# Patient Record
Sex: MALE | Race: WHITE | NOT HISPANIC OR LATINO | Employment: OTHER | URBAN - NONMETROPOLITAN AREA
[De-identification: names, ages, dates, MRNs, and addresses within clinical notes are randomized per-mention and may not be internally consistent; named-entity substitution may affect disease eponyms.]

---

## 2018-05-11 ENCOUNTER — HOSPITAL ENCOUNTER (OUTPATIENT)
Dept: GENERAL RADIOLOGY | Facility: HOSPITAL | Age: 43
Discharge: HOME | End: 2018-05-11
Admitting: FAMILY MEDICINE

## 2018-05-11 ENCOUNTER — TRANSCRIBE ORDERS (OUTPATIENT)
Dept: ADMINISTRATIVE | Facility: HOSPITAL | Age: 43
End: 2018-05-11

## 2018-05-11 DIAGNOSIS — R05.9 COUGH: Primary | ICD-10-CM

## 2018-05-11 PROCEDURE — 71046 X-RAY EXAM CHEST 2 VIEWS: CPT

## 2018-05-14 ENCOUNTER — TRANSCRIBE ORDERS (OUTPATIENT)
Dept: ADMINISTRATIVE | Facility: HOSPITAL | Age: 43
End: 2018-05-14

## 2018-05-14 DIAGNOSIS — R06.89 HYPOVENTILATION, IDIOPATHIC: ICD-10-CM

## 2018-05-14 DIAGNOSIS — R74.8 ELEVATED LIVER ENZYMES: Primary | ICD-10-CM

## 2018-05-16 ENCOUNTER — HOSPITAL ENCOUNTER (OUTPATIENT)
Dept: ULTRASOUND IMAGING | Facility: HOSPITAL | Age: 43
Discharge: HOME | End: 2018-05-16
Admitting: FAMILY MEDICINE

## 2018-05-16 DIAGNOSIS — R74.8 ELEVATED LIVER ENZYMES: ICD-10-CM

## 2018-05-16 PROCEDURE — 76700 US EXAM ABDOM COMPLETE: CPT

## 2018-05-17 ENCOUNTER — HOSPITAL ENCOUNTER (OUTPATIENT)
Dept: CT IMAGING | Facility: HOSPITAL | Age: 43
Discharge: HOME | End: 2018-05-17
Admitting: FAMILY MEDICINE

## 2018-05-17 DIAGNOSIS — R06.89 HYPOVENTILATION, IDIOPATHIC: ICD-10-CM

## 2018-05-17 PROCEDURE — 25010000002 IOPAMIDOL 61 % SOLUTION: Performed by: FAMILY MEDICINE

## 2018-05-17 PROCEDURE — 71275 CT ANGIOGRAPHY CHEST: CPT

## 2018-05-17 RX ADMIN — IOPAMIDOL 100 ML: 612 INJECTION, SOLUTION INTRAVENOUS at 08:45

## 2018-05-18 ENCOUNTER — HOSPITAL ENCOUNTER (OUTPATIENT)
Dept: CT IMAGING | Facility: HOSPITAL | Age: 43
End: 2018-05-18

## 2018-07-02 ENCOUNTER — LAB (OUTPATIENT)
Dept: LAB | Facility: HOSPITAL | Age: 43
End: 2018-07-02

## 2018-07-02 ENCOUNTER — CONSULT (OUTPATIENT)
Dept: ONCOLOGY | Facility: CLINIC | Age: 43
End: 2018-07-02

## 2018-07-02 VITALS
TEMPERATURE: 97.6 F | OXYGEN SATURATION: 100 % | HEART RATE: 94 BPM | HEIGHT: 68 IN | SYSTOLIC BLOOD PRESSURE: 99 MMHG | WEIGHT: 156 LBS | BODY MASS INDEX: 23.64 KG/M2 | RESPIRATION RATE: 14 BRPM | DIASTOLIC BLOOD PRESSURE: 65 MMHG

## 2018-07-02 DIAGNOSIS — I27.82 OTHER CHRONIC PULMONARY EMBOLISM WITHOUT ACUTE COR PULMONALE (HCC): Primary | ICD-10-CM

## 2018-07-02 DIAGNOSIS — I27.82 OTHER CHRONIC PULMONARY EMBOLISM WITHOUT ACUTE COR PULMONALE (HCC): ICD-10-CM

## 2018-07-02 PROBLEM — I26.99 PULMONARY EMBOLISM (HCC): Status: ACTIVE | Noted: 2018-07-02

## 2018-07-02 PROCEDURE — 81240 F2 GENE: CPT

## 2018-07-02 PROCEDURE — 86146 BETA-2 GLYCOPROTEIN ANTIBODY: CPT

## 2018-07-02 PROCEDURE — 99204 OFFICE O/P NEW MOD 45 MIN: CPT | Performed by: INTERNAL MEDICINE

## 2018-07-02 PROCEDURE — 85300 ANTITHROMBIN III ACTIVITY: CPT

## 2018-07-02 PROCEDURE — 36415 COLL VENOUS BLD VENIPUNCTURE: CPT

## 2018-07-02 PROCEDURE — 86147 CARDIOLIPIN ANTIBODY EA IG: CPT

## 2018-07-02 PROCEDURE — 81241 F5 GENE: CPT

## 2018-07-02 RX ORDER — SIMVASTATIN 20 MG
20 TABLET ORAL DAILY
Refills: 0 | COMMUNITY
Start: 2018-06-18 | End: 2019-04-10

## 2018-07-02 RX ORDER — LOSARTAN POTASSIUM 25 MG/1
25 TABLET ORAL DAILY
Refills: 0 | COMMUNITY
Start: 2018-06-13 | End: 2019-04-10

## 2018-07-02 RX ORDER — DIAZEPAM 10 MG/1
10 TABLET ORAL 2 TIMES DAILY PRN
Refills: 0 | COMMUNITY
Start: 2018-06-12 | End: 2019-04-10

## 2018-07-02 RX ORDER — FOLIC ACID 1 MG/1
1 TABLET ORAL DAILY
COMMUNITY
End: 2019-04-10

## 2018-07-02 RX ORDER — CITALOPRAM 20 MG/1
TABLET ORAL
Refills: 0 | COMMUNITY
Start: 2018-06-12

## 2018-07-02 NOTE — PROGRESS NOTES
ID: 43 y.o. year old male from [place] [address]    PCP: Hailey Guerra MD    REFERRING PHYSICIAN: Hailey Guerra MD    Reason for Consultation: Hx of pulmonary embolism    Dear Dr. Guerra    I am seeing Mr. Ramires today in consultation because of his history of a pulmonary embolism.  She reports that over 5 years ago he presented with a large PE which required an extensive admission to the hospital.  He subsequently was placed on Coumadin which was hard to titrate and so was switched to a normal anticoagulant.  He'll finish that after 6 months and was discontinued on treatment.  Since then he's been doing reasonably well.  He reports no prior clot that he does have a brother who has also a pulmonary embolism.  He has an extensive alcohol abuse history and he smokes a pack and a half per day.  He reports having some chest tightness and pleuritic chest pain that's ongoing.  The chest pain appears to be musculoskeletal since he can palpate it on his chest wall.  He recently stopped drinking.      Past Medical History:   Diagnosis Date   • ETOH abuse    • Pulmonary embolism (CMS/HCC)        Past Surgical History:   Procedure Laterality Date   • SHOULDER SURGERY Right        Social History     Social History   • Marital status: Single     Social History Main Topics   • Smoking status: Current Every Day Smoker     Packs/day: 1.50     Types: Cigarettes   • Alcohol use Yes   • Drug use: No   • Sexual activity: Defer     Other Topics Concern   • Not on file       Family History   Problem Relation Age of Onset   • Hypertension Mother    • Lung cancer Father 61   • Hypertension Brother    • Breast cancer Paternal Grandmother    • Kidney cancer Paternal Grandfather    • Kidney disease Maternal Aunt        Review of Systems:    16 point review of systems was performed and reviewed and scanned into the EMR      Current Outpatient Prescriptions:   •  folic acid (FOLVITE) 1 MG tablet, Take 1 mg by mouth Daily., Disp:  , Rfl:   •  Multiple Vitamins-Minerals (CERTAVITE/ANTIOXIDANTS PO), Take  by mouth., Disp: , Rfl:   •  citalopram (CeleXA) 20 MG tablet, take 1/2 tablet by mouth daily for 7 days then 1 tablet daily, Disp: , Rfl: 0  •  diazePAM (VALIUM) 10 MG tablet, Take 10 mg by mouth 2 (Two) Times a Day As Needed., Disp: , Rfl: 0  •  losartan (COZAAR) 25 MG tablet, Take 25 mg by mouth Daily., Disp: , Rfl: 0  •  simvastatin (ZOCOR) 20 MG tablet, Take 20 mg by mouth Daily., Disp: , Rfl: 0    Allergies not on file    ECOG SCORE: 0    Objective     Vitals:    07/02/18 1334   BP: 99/65   Pulse: 94   Resp: 14   Temp: 97.6 °F (36.4 °C)   SpO2: 100%       Physical Exam    General: well appearing, in no acute distress  HEENT: sclera anicteric, oropharynx clear, neck is supple  Lymphatics: no cervical, supraclavicular, or axillary adenopathy  Cardiovascular: regular rate and rhythm, no murmurs, rubs or gallops  Lungs: clear to auscultation bilaterally  Abdomen: soft, nontender, nondistended.  No palpable organomegaly  Extremities: no lower extremity edema  Skin: no rashes, lesions, bruising, or petechiae  Msk:  Shows no weakness of the large muscle groups  Psych: Mood is stable      Lab Results   Component Value Date    GLUCOSE 83 03/20/2014    BUN 16 03/20/2014    CREATININE 0.92 03/20/2014     03/20/2014    K 3.7 03/20/2014     03/20/2014    CO2 25.0 03/20/2014    CALCIUM 8.5 03/20/2014    PROTEINTOT 6.9 03/20/2014    ALBUMIN 4.1 03/20/2014    BILITOT 0.3 03/20/2014    ALKPHOS 78 03/20/2014    AST 64 (H) 03/20/2014    ALT 52 (H) 03/20/2014       Lab Results   Component Value Date    HGB 14.2 03/20/2014    HCT 42.0 03/20/2014    MCV 98.4 (H) 03/20/2014     03/20/2014    WBC 5.8 03/20/2014    NEUTROABS 2.5 03/20/2014    LYMPHSABS 2.4 03/20/2014    MONOSABS 0.7 03/20/2014    EOSABS 0.1 03/20/2014    BASOSABS 0.1 03/20/2014       ASSESSMENT:    43-year-old gentleman with history of pulmonary embolism likely related to his  underlying work which involves  at that time.  He had just made a trip to [place] and presented with some leg pain subsequently acute chest pain which led to his diagnosis.  The extensive smoking probably also contributed to his risk.  Because his brother also has blood clots and the patient has a daughter, I would recommend hypercoagulable testing which I have ordered today.  If this is negative he does not need any further workup from my standpoint.  He however does need to have smoking cessation and continue to refrain from heavy alcohol use.  He should also.  Attention to long car rides and periods of  Inactivity.        Thank you for allowing me to participate in the care of this patient.    Yours sincerely,    Viridiana Godinez MD  [facility]  Hematology and Oncology    Return on: 08/02/18  Return in (Approximately): 1 month    Orders Placed This Encounter   Procedures   • Antithrombin III     Standing Status:   Future     Number of Occurrences:   1     Standing Expiration Date:   7/2/2019   • Factor 5 Leiden     Standing Status:   Future     Number of Occurrences:   1     Standing Expiration Date:   7/2/2019   • Prothrombin gene mutation     Standing Status:   Future     Number of Occurrences:   1     Standing Expiration Date:   7/2/2019   • Beta-2 Glycoprotein Antibodies     Standing Status:   Future     Number of Occurrences:   1     Standing Expiration Date:   7/2/2019   • Cardiolipin Antibody     Standing Status:   Future     Number of Occurrences:   1     Standing Expiration Date:   7/2/2019         Please note that portions of this note may have been completed with a voice recognition program. Efforts were made to edit the dictations, but occasionally words are mistranscribed.

## 2018-07-04 LAB
AT III PPP CHRO-ACNC: 64 % (ref 75–135)
B2 GLYCOPROT1 IGA SER-ACNC: <9 GPI IGA UNITS (ref 0–25)
B2 GLYCOPROT1 IGG SER-ACNC: <9 GPI IGG UNITS (ref 0–20)
B2 GLYCOPROT1 IGM SER-ACNC: <9 GPI IGM UNITS (ref 0–32)

## 2018-07-06 LAB
F2 GENE MUT ANL BLD/T: ABNORMAL
F5 GENE MUT ANL BLD/T: NORMAL

## 2018-07-09 LAB — REF LAB TEST METHOD: NORMAL

## 2018-08-08 ENCOUNTER — TELEPHONE (OUTPATIENT)
Dept: ONCOLOGY | Facility: CLINIC | Age: 43
End: 2018-08-08

## 2018-08-08 NOTE — TELEPHONE ENCOUNTER
----- Message from eLeanne Hemphill sent at 8/7/2018  3:39 PM EDT -----  Regarding: JOE - LAB RESULTS  Contact: [phone]  PATIENT CALLED AND WANTED LAB RESULTS.  PLEASE CALL BACK

## 2018-08-08 NOTE — TELEPHONE ENCOUNTER
Patient called and requested lab results.  Explained to patient that Dr Blevins was off on Tuesdays to be able to discuss the results of labs.  Patient asked to have Dr Blevins call him with those results to discuss that he didn't want to travel to clinic for follow up visit that is scheduled for next week reporting concern with the cost for visit when the results can be given to him over the phone.  I explained that I would discuss with Dr Blevins.    Discussed with Dr Blevins that patient requested a phone call instead of traveling and paying co pay for lab results.  Dr Blevins to look over labs and decide if a call was appropriated or patient needs to keep follow up visit.

## 2018-08-09 ENCOUNTER — TELEPHONE (OUTPATIENT)
Dept: ONCOLOGY | Facility: CLINIC | Age: 43
End: 2018-08-09

## 2018-08-09 NOTE — TELEPHONE ENCOUNTER
----- Message from Leeanne Hemphill sent at 8/7/2018  3:39 PM EDT -----  Regarding: [facility]E - LAB RESULTS  Contact: [phone]  PATIENT CALLED AND WANTED LAB RESULTS.  PLEASE CALL BACK

## 2018-08-09 NOTE — TELEPHONE ENCOUNTER
Returned call to patient after discussing with Dr Blevins.  Reported to patient that patient has Factor II heterozygate, and Dr Blevins advises that children need to be tested.  Patient verbalized understanding and ask to have follow up appointment cancelled.

## 2018-10-01 ENCOUNTER — TRANSCRIBE ORDERS (OUTPATIENT)
Dept: ADMINISTRATIVE | Facility: HOSPITAL | Age: 43
End: 2018-10-01

## 2018-10-01 DIAGNOSIS — R06.09 DYSPNEA ON EXERTION: Primary | ICD-10-CM

## 2018-11-19 ENCOUNTER — TELEPHONE (OUTPATIENT)
Dept: GENETICS | Facility: HOSPITAL | Age: 43
End: 2018-11-19

## 2018-11-19 NOTE — TELEPHONE ENCOUNTER
Called to schedule Genetic appt. Patients spouse stated they are not interested in having Genetics done at this time. Will call back if they change their minds.

## 2018-11-26 ENCOUNTER — APPOINTMENT (OUTPATIENT)
Dept: NUTRITION | Facility: HOSPITAL | Age: 43
End: 2018-11-26

## 2018-12-03 ENCOUNTER — LAB REQUISITION (OUTPATIENT)
Dept: LAB | Facility: HOSPITAL | Age: 43
End: 2018-12-03

## 2018-12-03 DIAGNOSIS — R94.5 ABNORMAL RESULTS OF LIVER FUNCTION STUDIES: ICD-10-CM

## 2018-12-03 LAB
ADV 40+41 DNA STL QL NAA+NON-PROBE: NOT DETECTED
ASTRO TYP 1-8 RNA STL QL NAA+NON-PROBE: NOT DETECTED
C CAYETANENSIS DNA STL QL NAA+NON-PROBE: NOT DETECTED
CAMPY SP DNA.DIARRHEA STL QL NAA+PROBE: NOT DETECTED
CRYPTOSP STL CULT: NOT DETECTED
E COLI DNA SPEC QL NAA+PROBE: NOT DETECTED
E HISTOLYT AG STL-ACNC: NOT DETECTED
EAEC PAA PLAS AGGR+AATA ST NAA+NON-PRB: NOT DETECTED
EC STX1 + STX2 GENES STL NAA+PROBE: NOT DETECTED
EPEC EAE GENE STL QL NAA+NON-PROBE: NOT DETECTED
ETEC LTA+ST1A+ST1B TOX ST NAA+NON-PROBE: NOT DETECTED
G LAMBLIA DNA SPEC QL NAA+PROBE: NOT DETECTED
NOROVIRUS GI+II RNA STL QL NAA+NON-PROBE: NOT DETECTED
P SHIGELLOIDES DNA STL QL NAA+NON-PROBE: NOT DETECTED
RV RNA STL NAA+PROBE: NOT DETECTED
SALMONELLA DNA SPEC QL NAA+PROBE: NOT DETECTED
SAPO I+II+IV+V RNA STL QL NAA+NON-PROBE: NOT DETECTED
SHIGELLA SP+EIEC IPAH STL QL NAA+PROBE: NOT DETECTED
V CHOLERAE DNA SPEC QL NAA+PROBE: NOT DETECTED
VIBRIO DNA SPEC NAA+PROBE: NOT DETECTED
YERSINIA STL CULT: NOT DETECTED

## 2018-12-03 PROCEDURE — 87507 IADNA-DNA/RNA PROBE TQ 12-25: CPT

## 2019-04-10 ENCOUNTER — CONSULT (OUTPATIENT)
Dept: CARDIOLOGY | Facility: CLINIC | Age: 44
End: 2019-04-10

## 2019-04-10 VITALS
DIASTOLIC BLOOD PRESSURE: 100 MMHG | HEART RATE: 85 BPM | SYSTOLIC BLOOD PRESSURE: 150 MMHG | BODY MASS INDEX: 23.74 KG/M2 | WEIGHT: 165.8 LBS | HEIGHT: 70 IN

## 2019-04-10 DIAGNOSIS — Z72.0 TOBACCO ABUSE: ICD-10-CM

## 2019-04-10 DIAGNOSIS — R07.89 CHEST PAIN, ATYPICAL: ICD-10-CM

## 2019-04-10 DIAGNOSIS — I47.1 PAROXYSMAL SVT (SUPRAVENTRICULAR TACHYCARDIA) (HCC): Primary | ICD-10-CM

## 2019-04-10 PROBLEM — F10.20 ETOH DEPENDENCE (HCC): Status: ACTIVE | Noted: 2019-04-10

## 2019-04-10 PROCEDURE — 93000 ELECTROCARDIOGRAM COMPLETE: CPT | Performed by: PHYSICIAN ASSISTANT

## 2019-04-10 PROCEDURE — 99243 OFF/OP CNSLTJ NEW/EST LOW 30: CPT | Performed by: PHYSICIAN ASSISTANT

## 2019-04-10 NOTE — PROGRESS NOTES
"[facility]  INITIAL OFFICE CONSULT      Delta Ramires  1975  PCP: Hailey Guerra MD    SUBJECTIVE:   Delta Ramires is a 44 y.o. male seen for a consultation visit regarding the following:     Chief Complaint:   Chief Complaint   Patient presents with   • PSVT     CONSULT    • Migraine   • Tremors          Consultation is requested by Delfino Greenfield MD for evaluation of PSVT (CONSULT ); Migraine; and Tremors        History:  Mr. Ramires is a 44-year-old gentleman referred to our office by Dr. 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  He is a gentleman that originally presented to [facility] [place] with atypical chest pain back in December 2018.  During this time he had a workup including a stress test and echocardiogram revealing normal LV function no significant ischemia noted.  He states since that time he has had multiple episodes of tachypalpitations.  These last several minutes and fermin on their own.  However 2 weeks ago he had an episode of lasted several hours and he presented again back to [facility].  There he received IV adenosine and converted to sinus rhythm with an EKG suggesting possible AVNRT.  Since that time he continues have multiple breakthrough episodes.  He was given metoprolol to use \"as needed\".  He states he is taking this pill pretty much every day.  In addition above medical history he also has struggled with alcohol tobacco use.  He denies any further chest pain at this time.  He denies any heart failure symptoms.  He denies any syncope events.  Occasionally when he has palpitations standing for long period time he does get slightly dizzy.  In view of his symptoms abnormal EKG is referred to our office for consideration of EP study possible ablation.      Cardiac PMH: (Old records have been reviewed and summarized below)  1. Atypical chest pain   A)Negative Stress test 12/18 No ischemia, Normal " EF   B)Echocardiorgram 12/18 normal EF.   2. Tobacco abuse  3. ETOH use.  4. Elevated LFT's  5. Essential tremors.   6. HTN  6. HLD: No statins secondary to elevated LFT's      Past Medical History, Past Surgical History, Family history, Social History, and Medications were all reviewed with the patient today and updated as necessary.     Current Outpatient Medications   Medication Sig Dispense Refill   • citalopram (CeleXA) 20 MG tablet take 1/2 tablet by mouth daily for 7 days then 1 tablet daily  0   • metoprolol tartrate (LOPRESSOR) 25 MG tablet Take 25 mg by mouth 2 (Two) Times a Day.       No current facility-administered medications for this visit.      No Known Allergies      Past Medical History:   Diagnosis Date   • ETOH abuse    • Pulmonary embolism (CMS/HCC)      Past Surgical History:   Procedure Laterality Date   • SHOULDER SURGERY Right    • WISDOM TOOTH EXTRACTION       Family History   Problem Relation Age of Onset   • Hypertension Mother    • Lung cancer Father 61   • Hypertension Brother    • Breast cancer Paternal Grandmother    • Kidney cancer Paternal Grandfather    • Kidney disease Maternal Aunt      Social History     Tobacco Use   • Smoking status: Current Every Day Smoker     Packs/day: 1.50     Types: Cigarettes   • Smokeless tobacco: Never Used   Substance Use Topics   • Alcohol use: Yes       ROS:  Review of Symptoms:  General: no recent weight loss/gain, weakness or fatigue  Skin: no rashes, lumps, or other skin changes  HEENT: no dizziness, lightheadedness, or vision changes  Respiratory: no cough or hemoptysis  Cardiovascular: + palpitations, and tachycardia  Gastrointestinal: no black/tarry stools or diarrhea  Urinary: no change in frequency or urgency  Peripheral Vascular: no claudication or leg cramps  Musculoskeletal: no muscle or joint pain/stiffness  Psychiatric:+ anxiety.   Neurological: no sensory or motor loss, no syncope. + tremors  Hematologic: no anemia, easy bruising or  "bleeding  Endocrine: no thyroid problems, nor heat or cold intolerance         PHYSICAL EXAM:   /100 (BP Location: Left arm, Patient Position: Sitting)   Pulse 85   Ht 177.8 cm (70\")   Wt 75.2 kg (165 lb 12.8 oz)   BMI 23.79 kg/m²      Wt Readings from Last 5 Encounters:   04/10/19 75.2 kg (165 lb 12.8 oz)   07/02/18 70.8 kg (156 lb)     BP Readings from Last 5 Encounters:   04/10/19 150/100   07/02/18 99/65       General-Well Nourished, Well developed  Eyes - PERRLA  Neck- supple, No mass  CV- regular rate and rhythm, no MRG  Lung- clear bilaterally  Abd- soft, +BS  Musc/skel - Norm strength and range of motion  Skin- warm and dry  Neuro - Alert & Oriented x 3, appropriate mood.    Medical problems and test results were reviewed with the patient today.     Results for orders placed or performed in visit on 12/03/18   Gastrointestinal Panel, PCR - Stool, Per Rectum   Result Value Ref Range    Campylobacter Not Detected Not Detected    Plesiomonas shigelloides Not Detected Not Detected    Salmonella Not Detected Not Detected    Vibrio Not Detected Not Detected    Vibrio cholerae Not Detected Not Detected    Yersinia enterocolitica Not Detected Not Detected    Enteroaggregative E. coli (EAEC) Not Detected Not Detected    Enteropathogenic E. coli (EPEC) Not Detected Not Detected    Enterotoxigenic E. coli (ETEC) lt/st Not Detected Not Detected    Shiga-like toxin-producing E. coli (STEC) stx1/stx2 Not Detected Not Detected    E. coli O157 Not Detected Not Detected    Shigella/Enteroinvasive E. coli (EIEC) Not Detected Not Detected    Cryptosporidium Not Detected Not Detected    Cyclospora cayetanensis Not Detected Not Detected    Entamoeba histolytica Not Detected Not Detected    Giardia lamblia Not Detected Not Detected    Adenovirus F40/41 Not Detected Not Detected    Astrovirus Not Detected Not Detected    Norovirus GI/GII Not Detected Not Detected    Rotavirus A Not Detected Not Detected    Sapovirus (I, " II, IV or V) Not Detected Not Detected         No results found for: CHOL, HDL, HDLC, LDL, LDLC, VLDL    EKG:  (EKG/Tracing has been independently visualized by me and summarized below)      ECG 12 Lead  Date/Time: 4/10/2019 4:08 PM  Performed by: Isak Johnson PA  Authorized by: Isak Johnson PA   Rhythm: sinus rhythm  Rate: normal  Conduction: conduction normal  QRS axis: normal  Other findings: poor R wave progression    Clinical impression: abnormal EKG            ASSESSMENT   1. SVT: Adenosine sensitive.  EKG suggest possible AVNRT with Retrograde P waves.    2. Atypical chest pain:  Negative MPS 12/18.  Echocardiogram normal EF.  3. Tobacco abuse:  Stop smoking, discussed cessation techniques and strategy.   4. ETOH Abuse:  Discussed with patient need for Rehab, reduction of intake  5. Depression/Anxiety.  6. Elevated LFT's/On going work up for Hemochromatosis     PLAN  · Discussed in detail with the patient the findings on EKG revealing an SVT most likely possible AVNRT.  He reports he has had multiple recurrent episodes and we have recommended he initiate Lopressor 25 mg twice daily.  In addition we have told him about vasovagal maneuvers or using an extra dose of metoprolol as needed for breakthrough SVT that is sustained.  · Discussed in detail the patient needs to stop smoking to reduce alcohol intake.  · We have offered the patient the option of considering EP study and possible radiofrequency ablation.  The risk and benefits of this procedure were explained in detail and to the patient as well as his girlfriend who accompanies him in the room.  He would like to schedule as soon as possible as he feels these episodes are interfering with his daily life and activities. He will hold loperessor five days prior to the procedure.     Cardiology/Electrophysiology  04/10/19  2:41 PM  Will Alex ALFARO

## 2019-05-01 ENCOUNTER — HOSPITAL ENCOUNTER (OUTPATIENT)
Facility: HOSPITAL | Age: 44
Discharge: HOME | End: 2019-05-01
Attending: INTERNAL MEDICINE | Admitting: PHYSICIAN ASSISTANT

## 2019-05-01 VITALS
HEIGHT: 70 IN | HEART RATE: 90 BPM | SYSTOLIC BLOOD PRESSURE: 155 MMHG | OXYGEN SATURATION: 96 % | DIASTOLIC BLOOD PRESSURE: 107 MMHG | RESPIRATION RATE: 10 BRPM | BODY MASS INDEX: 23.73 KG/M2 | TEMPERATURE: 98 F | WEIGHT: 165.79 LBS

## 2019-05-01 DIAGNOSIS — I47.1 PAROXYSMAL SVT (SUPRAVENTRICULAR TACHYCARDIA) (HCC): ICD-10-CM

## 2019-05-01 LAB
ANION GAP SERPL CALCULATED.3IONS-SCNC: 13 MMOL/L
BUN BLD-MCNC: 7 MG/DL (ref 6–20)
BUN/CREAT SERPL: 12.5 (ref 7–25)
CALCIUM SPEC-SCNC: 8.8 MG/DL (ref 8.6–10.5)
CHLORIDE SERPL-SCNC: 102 MMOL/L (ref 98–107)
CO2 SERPL-SCNC: 24 MMOL/L (ref 22–29)
CREAT BLD-MCNC: 0.56 MG/DL (ref 0.76–1.27)
DEPRECATED RDW RBC AUTO: 58.9 FL (ref 37–54)
ERYTHROCYTE [DISTWIDTH] IN BLOOD BY AUTOMATED COUNT: 15.1 % (ref 12.3–15.4)
GFR SERPL CREATININE-BSD FRML MDRD: >150 ML/MIN/1.73
GLUCOSE BLD-MCNC: 96 MG/DL (ref 65–99)
HCT VFR BLD AUTO: 41.9 % (ref 37.5–51)
HGB BLD-MCNC: 13.8 G/DL (ref 13–17.7)
MCH RBC QN AUTO: 35.1 PG (ref 26.6–33)
MCHC RBC AUTO-ENTMCNC: 32.9 G/DL (ref 31.5–35.7)
MCV RBC AUTO: 106.6 FL (ref 79–97)
PLATELET # BLD AUTO: 171 10*3/MM3 (ref 140–450)
PMV BLD AUTO: 10.2 FL (ref 6–12)
POTASSIUM BLD-SCNC: 4.8 MMOL/L (ref 3.5–5.2)
RBC # BLD AUTO: 3.93 10*6/MM3 (ref 4.14–5.8)
SODIUM BLD-SCNC: 139 MMOL/L (ref 136–145)
WBC NRBC COR # BLD: 5.43 10*3/MM3 (ref 3.4–10.8)

## 2019-05-01 PROCEDURE — C1730 CATH, EP, 19 OR FEW ELECT: HCPCS | Performed by: INTERNAL MEDICINE

## 2019-05-01 PROCEDURE — 93623 PRGRMD STIMJ&PACG IV RX NFS: CPT | Performed by: INTERNAL MEDICINE

## 2019-05-01 PROCEDURE — 99152 MOD SED SAME PHYS/QHP 5/>YRS: CPT | Performed by: INTERNAL MEDICINE

## 2019-05-01 PROCEDURE — 85027 COMPLETE CBC AUTOMATED: CPT | Performed by: INTERNAL MEDICINE

## 2019-05-01 PROCEDURE — 36415 COLL VENOUS BLD VENIPUNCTURE: CPT

## 2019-05-01 PROCEDURE — 93653 COMPRE EP EVAL TX SVT: CPT | Performed by: INTERNAL MEDICINE

## 2019-05-01 PROCEDURE — 25010000002 ENOXAPARIN PER 10 MG: Performed by: INTERNAL MEDICINE

## 2019-05-01 PROCEDURE — 93005 ELECTROCARDIOGRAM TRACING: CPT | Performed by: INTERNAL MEDICINE

## 2019-05-01 PROCEDURE — C1733 CATH, EP, OTHR THAN COOL-TIP: HCPCS | Performed by: INTERNAL MEDICINE

## 2019-05-01 PROCEDURE — S0260 H&P FOR SURGERY: HCPCS | Performed by: INTERNAL MEDICINE

## 2019-05-01 PROCEDURE — 25010000002 MIDAZOLAM PER 1 MG: Performed by: INTERNAL MEDICINE

## 2019-05-01 PROCEDURE — 93010 ELECTROCARDIOGRAM REPORT: CPT | Performed by: INTERNAL MEDICINE

## 2019-05-01 PROCEDURE — 80048 BASIC METABOLIC PNL TOTAL CA: CPT | Performed by: INTERNAL MEDICINE

## 2019-05-01 PROCEDURE — C1894 INTRO/SHEATH, NON-LASER: HCPCS | Performed by: INTERNAL MEDICINE

## 2019-05-01 PROCEDURE — 93621 COMP EP EVL L PAC&REC C SINS: CPT | Performed by: INTERNAL MEDICINE

## 2019-05-01 PROCEDURE — 25010000002 FENTANYL CITRATE (PF) 100 MCG/2ML SOLUTION: Performed by: INTERNAL MEDICINE

## 2019-05-01 PROCEDURE — 93609 INTRA-VNTR MAPG TCHYCAR SITE: CPT | Performed by: INTERNAL MEDICINE

## 2019-05-01 PROCEDURE — 99153 MOD SED SAME PHYS/QHP EA: CPT | Performed by: INTERNAL MEDICINE

## 2019-05-01 RX ORDER — ONDANSETRON 2 MG/ML
4 INJECTION INTRAMUSCULAR; INTRAVENOUS EVERY 6 HOURS PRN
Status: DISCONTINUED | OUTPATIENT
Start: 2019-05-01 | End: 2019-05-01 | Stop reason: HOSPADM

## 2019-05-01 RX ORDER — OXYCODONE HYDROCHLORIDE AND ACETAMINOPHEN 5; 325 MG/1; MG/1
1 TABLET ORAL EVERY 4 HOURS PRN
Status: DISCONTINUED | OUTPATIENT
Start: 2019-05-01 | End: 2019-05-01 | Stop reason: HOSPADM

## 2019-05-01 RX ORDER — LIDOCAINE HYDROCHLORIDE 10 MG/ML
INJECTION, SOLUTION INFILTRATION; PERINEURAL AS NEEDED
Status: DISCONTINUED | OUTPATIENT
Start: 2019-05-01 | End: 2019-05-01 | Stop reason: HOSPADM

## 2019-05-01 RX ORDER — MIDAZOLAM HYDROCHLORIDE 1 MG/ML
INJECTION INTRAMUSCULAR; INTRAVENOUS AS NEEDED
Status: DISCONTINUED | OUTPATIENT
Start: 2019-05-01 | End: 2019-05-01 | Stop reason: HOSPADM

## 2019-05-01 RX ORDER — ACETAMINOPHEN 160 MG/5ML
650 SOLUTION ORAL EVERY 4 HOURS PRN
Status: DISCONTINUED | OUTPATIENT
Start: 2019-05-01 | End: 2019-05-01 | Stop reason: HOSPADM

## 2019-05-01 RX ORDER — SODIUM CHLORIDE 9 MG/ML
INJECTION, SOLUTION INTRAVENOUS CONTINUOUS PRN
Status: COMPLETED | OUTPATIENT
Start: 2019-05-01 | End: 2019-05-01

## 2019-05-01 RX ORDER — ACETAMINOPHEN 650 MG/1
650 SUPPOSITORY RECTAL EVERY 4 HOURS PRN
Status: DISCONTINUED | OUTPATIENT
Start: 2019-05-01 | End: 2019-05-01 | Stop reason: HOSPADM

## 2019-05-01 RX ORDER — IBUPROFEN 400 MG/1
400 TABLET ORAL EVERY 6 HOURS PRN
Status: DISCONTINUED | OUTPATIENT
Start: 2019-05-01 | End: 2019-05-01 | Stop reason: HOSPADM

## 2019-05-01 RX ORDER — FENTANYL CITRATE 50 UG/ML
INJECTION, SOLUTION INTRAMUSCULAR; INTRAVENOUS AS NEEDED
Status: DISCONTINUED | OUTPATIENT
Start: 2019-05-01 | End: 2019-05-01 | Stop reason: HOSPADM

## 2019-05-01 RX ORDER — ACETAMINOPHEN 325 MG/1
650 TABLET ORAL EVERY 4 HOURS PRN
Status: DISCONTINUED | OUTPATIENT
Start: 2019-05-01 | End: 2019-05-01 | Stop reason: HOSPADM

## 2019-05-01 RX ADMIN — ENOXAPARIN SODIUM 40 MG: 40 INJECTION SUBCUTANEOUS at 16:03

## 2019-05-01 NOTE — H&P
"Primary Cardiologist: n/a    Chief Complaint: SVT       Subjective:     Patient is a 44 y.o. male who presents with recurrent SVT for EPS +/- RFA. He was seen by Kameron Johnson in the office and was arranged for procedure. He has not had any changes since that visit.     History:  Mr. Ramires is a 44-year-old gentleman referred to our office by Dr. 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  He is a gentleman that originally presented to [facility] with atypical chest pain back in December 2018.  During this time he had a workup including a stress test and echocardiogram revealing normal LV function no significant ischemia noted.  He states since that time he has had multiple episodes of tachypalpitations.  These last several minutes and fermin on their own.  However 2 weeks ago he had an episode of lasted several hours and he presented again back to [facility].  There he received IV adenosine and converted to sinus rhythm with an EKG suggesting possible AVNRT.  Since that time he continues have multiple breakthrough episodes.  He was given metoprolol to use \"as needed\".  He states he is taking this pill pretty much every day.  In addition above medical history he also has struggled with alcohol tobacco use.  He denies any further chest pain at this time.  He denies any heart failure symptoms.  He denies any syncope events.  Occasionally when he has palpitations standing for long period time he does get slightly dizzy.  In view of his symptoms abnormal EKG is referred to our office for consideration of EP study possible ablation.        Cardiac PMH: (Old records have been reviewed and summarized below)  1. Atypical chest pain              A)Negative Stress test 12/18 No ischemia, Normal EF              B)Echocardiorgram 12/18 normal EF.   2. Tobacco abuse  3. ETOH use.  4. Elevated LFT's  5. Essential tremors.   6. HTN  6. HLD: No statins secondary to elevated LFT's        Past Medical " History:   Diagnosis Date   • ETOH abuse    • Pulmonary embolism (CMS/HCC)       Past Surgical History:   Procedure Laterality Date   • SHOULDER SURGERY Right    • WISDOM TOOTH EXTRACTION        No Known Allergies  Social History     Tobacco Use   • Smoking status: Current Every Day Smoker     Packs/day: 1.50     Types: Cigarettes   • Smokeless tobacco: Never Used   Substance Use Topics   • Alcohol use: Yes      FH:   Family History   Problem Relation Age of Onset   • Hypertension Mother    • Lung cancer Father 61   • Hypertension Brother    • Breast cancer Paternal Grandmother    • Kidney cancer Paternal Grandfather    • Kidney disease Maternal Aunt         No current facility-administered medications for this encounter.     Review of Systems  Review of Systems:  General: no recent weight loss/gain, weakness or fatigue  Skin: no rashes, lumps, or other skin changes  HEENT: no dizziness, lightheadedness, or vision changes  Respiratory: no cough or hemoptysis  Cardiovascular: + palpitations, and tachycardia  Gastrointestinal: no black/tarry stools or diarrhea  Urinary: no change in frequency or urgency  Peripheral Vascular: no claudication or leg cramps  Musculoskeletal: no muscle or joint pain/stiffness  Psychiatric: no depression or excessive stress  Neurological: no sensory or motor loss, no syncope  Hematologic: no anemia, easy bruising or bleeding  Endocrine: no thyroid problems, nor heat or cold intolerance        Objective:       There were no vitals taken for this visit.    No intake/output data recorded.  No intake/output data recorded.    Physical Exam:  General-Well Nourished, Well developed  Eyes - PERRLA  Neck- supple, No mass  CV- regular rate and rhythm, no MRG  Lung- clear bilaterally  Abd- soft, +BS  Musc/skel - Norm strength and range of motion  Skin- warm and dry  Neuro - Alert & Oriented x 3, appropriate mood.    Data Review:     No results found for this or any previous visit (from the past 24  hour(s)).        Assessment:     Paroxysmal SVT (supraventricular tachycardia) (CMS/HCC)         Plan:   1. SVT: adenosine sensitive, likely AVNRT. Recurrent, symptomatic episodes. Will plan for EPS +/- RFA today. Risks, benefits, and alternatives have been discussed and patient wishes to proceed.     2. Atypical Chest Pain- Negative MPS 12/18. Echo w/ normal LVEF.     Electronically signed by TRE Coffamn, 05/01/19, 10:04 AM.

## 2019-05-01 NOTE — PROCEDURES
PRE-ELECTROPHYSIOLOGY STUDY DIAGNOSIS:   1. Supraventricular tachycardia.    POST-ELECTROPHYSIOLOGY STUDY DIAGNOSIS:   2.  AV Node Re-entry tachycardia    PROCEDURES PERFORMED  1. Electrophysiology testing with supraventricular tachycardia ablation.  2. Left atrial pacing and recording from the coronary sinus.  3. Programmed pacing after infusion with isoproterenol  4. Interatrial mapping.  5. Moderate sedation    Anesthesia: Cath lab moderate sedation    I was present with the patient for the duration of moderate sedation and supervised staff who had no other duties and monitored the patient for the entire procedure     Name of independent trained observer: Shantel Ware RN  Intra-Service start time: 1252  Intra-Service end time: 1348    Estimated Blood Loss: Less than 10 mL     Specimens: None    PROCEDURE IN DETAIL: The patient was brought into the EP lab in a fasting  state. The right and left groins were prepped and draped in the usual  sterile fashion. Access was obtained in the right femoral vein via the  Seldinger technique over which an 8 and 5-Albanian sheath was placed.  Access was obtained in the left femoral vein via the Seldinger technique  through which two 5-Albanian sheaths were placed. Through the 5-Albanian  sheaths, 3 separate 5-Albanian catheters were positioned, first at the RV  apex, second at the region of the His bundle, third in the high right  atrium. Through the 8-Albanian sheath, a large curved 5 mm Blazer II  ablation catheter was placed in the coronary sinus with pacing and  recording from the left atrium. Thresholds were checked and a formal  electrophysiology test was performed.    1. Baseline rhythm showed normal sinus heart rhythm with an R-R interval of 620,   2. VA interval of 196,   3. QRS of 98,   4. QT of 339,   5. AH of 93,   6. HV of 51.    7. Sinus node recovery time at 600 was 671 at 400 was 744.   8. The AV Wenckebach cycle length was 300.   9. AV node refractory period at 600 was  230, at 400 was 260  10. The VA Wenckebach cycle length was 320.   11. VA node refractory period at 600 was <200, at 400 was 230  12. The ventricular effective refractory period at 600 was 220, at 400 was 210.     The patient was then started on isoproterenol at 2 mcg and was able to induce  sustained supraventricular tachycardia. After the confirmation of typical  AV node reentrant tachycardia with pacing maneuvers, the isoproterenol was turned off. The region of slow AV cali pathway was mapped out, and a series of ablations was  performed in the region of the slow AV cali pathway. There showed a  significant modification with no further induction of SVT.  After a  wait time and further induction attempts both on isoproterenol and off, there was  no further induction of SVT. The case was then ended. The sheaths were  then pulled. Hemostasis was achieved. The patient recovered from his  sedation and transferred from the lab in a stable condition.    IMPRESSION  1. Normal sinus rhythm at baseline.  2. Dual atrioventricular cali physiology.  3. 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  4. Successful catheter mapping and ablation of typical atrioventricular  node reentrant tachycardia.

## 2019-05-02 ENCOUNTER — TELEPHONE (OUTPATIENT)
Dept: CARDIOLOGY | Facility: CLINIC | Age: 44
End: 2019-05-02

## 2019-05-20 ENCOUNTER — OFFICE VISIT (OUTPATIENT)
Dept: CARDIOLOGY | Facility: CLINIC | Age: 44
End: 2019-05-20

## 2019-05-20 VITALS
HEIGHT: 70 IN | BODY MASS INDEX: 24.34 KG/M2 | DIASTOLIC BLOOD PRESSURE: 100 MMHG | HEART RATE: 100 BPM | WEIGHT: 170 LBS | SYSTOLIC BLOOD PRESSURE: 152 MMHG

## 2019-05-20 DIAGNOSIS — I47.1 PAROXYSMAL SVT (SUPRAVENTRICULAR TACHYCARDIA) (HCC): Primary | ICD-10-CM

## 2019-05-20 DIAGNOSIS — I10 ESSENTIAL HYPERTENSION: ICD-10-CM

## 2019-05-20 PROCEDURE — 99214 OFFICE O/P EST MOD 30 MIN: CPT | Performed by: INTERNAL MEDICINE

## 2019-05-20 PROCEDURE — 93000 ELECTROCARDIOGRAM COMPLETE: CPT | Performed by: INTERNAL MEDICINE

## 2019-05-20 RX ORDER — LISINOPRIL 5 MG/1
5 TABLET ORAL DAILY
Refills: 0 | COMMUNITY
Start: 2019-02-25 | End: 2019-05-20 | Stop reason: SDUPTHER

## 2019-05-20 RX ORDER — OMEPRAZOLE 20 MG/1
20 CAPSULE, DELAYED RELEASE ORAL AS NEEDED
Refills: 0 | COMMUNITY
Start: 2019-05-07

## 2019-05-20 RX ORDER — LISINOPRIL 5 MG/1
5 TABLET ORAL DAILY
Qty: 90 TABLET | Refills: 3 | Status: SHIPPED | OUTPATIENT
Start: 2019-05-20 | End: 2019-09-10

## 2019-05-20 NOTE — PROGRESS NOTES
Delta Ramires  1975  PCP: Hailey Guerra MD    SUBJECTIVE:   Delta Ramires is a 44 y.o. male seen for a follow up visit regarding the following:     Chief Complaint: Follow up for SVT    HPI:    Since last visit the patient's status has been stable but does report a feel seconds of tachycardia.     History:       Cardiac PMH: (Old records have been reviewed and summarized below)  1. Atypical chest pain   A)Negative Stress test 12/18 No ischemia, Normal EF   B)Echocardiorgram 12/18 normal EF.   2. Tobacco abuse  3. ETOH use.  4. Elevated LFT's  5. Essential tremors.   6. HTN  6. HLD: No statins secondary to elevated LFT's      Current Outpatient Medications:   •  citalopram (CeleXA) 20 MG tablet, take 1 tablet by mouth daily for 7 days then 1 tablet daily, Disp: , Rfl: 0  •  lisinopril (PRINIVIL,ZESTRIL) 5 MG tablet, Take 1 tablet by mouth Daily., Disp: 90 tablet, Rfl: 3  •  metoprolol tartrate (LOPRESSOR) 25 MG tablet, Take 1 tablet by mouth 2 (Two) Times a Day., Disp: 180 tablet, Rfl: 3  •  omeprazole (priLOSEC) 20 MG capsule, 20 mg As Needed., Disp: , Rfl: 0    Past Medical History, Past Surgical History, Family history, Social History, and Medications were all reviewed with the patient today and updated as necessary.       Patient Active Problem List   Diagnosis   • Pulmonary embolism (CMS/HCC)   • Paroxysmal SVT (supraventricular tachycardia) (CMS/HCC)   • Chest pain, atypical   • EtOH dependence (CMS/HCC)   • Tobacco abuse   • Essential hypertension     No Known Allergies  Past Medical History:   Diagnosis Date   • ETOH abuse    • Hypertension    • PONV (postoperative nausea and vomiting)    • Pulmonary embolism (CMS/HCC)    • Tobacco abuse      Past Surgical History:   Procedure Laterality Date   • CARDIAC ELECTROPHYSIOLOGY PROCEDURE N/A 5/1/2019    Procedure: EP/Ablation;  Surgeon: Doug Hurd MD;  Location: [facility] EP INVASIVE LOCATION;  Service: Cardiology   • CARDIAC  "ELECTROPHYSIOLOGY STUDY AND ABLATION      05/01/2019 per dr. williamson.   • FINGER SURGERY      reattached end of finger ( right hand)   • SHOULDER SURGERY Right    • WISDOM TOOTH EXTRACTION       Family History   Problem Relation Age of Onset   • Hypertension Mother    • Lung cancer Father 61   • Hypertension Brother    • Breast cancer Paternal Grandmother    • Kidney cancer Paternal Grandfather    • Kidney disease Maternal Aunt      Social History     Tobacco Use   • Smoking status: Current Every Day Smoker     Packs/day: 1.50     Years: 25.00     Pack years: 37.50     Types: Cigarettes   • Smokeless tobacco: Never Used   Substance Use Topics   • Alcohol use: Yes     Comment: 12 pack a day.         PHYSICAL EXAM:    /100 (BP Location: Left arm, Patient Position: Sitting)   Pulse 100   Ht 177.8 cm (70\")   Wt 77.1 kg (170 lb)   BMI 24.39 kg/m²        Wt Readings from Last 5 Encounters:   05/20/19 77.1 kg (170 lb)   05/01/19 75.2 kg (165 lb 12.6 oz)   04/10/19 75.2 kg (165 lb 12.8 oz)   07/02/18 70.8 kg (156 lb)       BP Readings from Last 5 Encounters:   05/20/19 152/100   05/01/19 (!) 155/107   04/10/19 150/100   07/02/18 99/65       General-Well Nourished, Well developed  Eyes - PERRLA  Neck- supple, No mass  CV- regular rate and rhythm, no MRG, No edema  Lung- clear bilaterally  Abd- soft, +BS  Musc/skel - Norm strength and range of motion  Skin- warm and dry  Neuro - Alert & Oriented x 3, appropriate mood.        Medical problems and test results were reviewed with the patient today.     Recent Results (from the past 672 hour(s))   CBC (No Diff)    Collection Time: 05/01/19 10:12 AM   Result Value Ref Range    WBC 5.43 3.40 - 10.80 10*3/mm3    RBC 3.93 (L) 4.14 - 5.80 10*6/mm3    Hemoglobin 13.8 13.0 - 17.7 g/dL    Hematocrit 41.9 37.5 - 51.0 %    .6 (H) 79.0 - 97.0 fL    MCH 35.1 (H) 26.6 - 33.0 pg    MCHC 32.9 31.5 - 35.7 g/dL    RDW 15.1 12.3 - 15.4 %    RDW-SD 58.9 (H) 37.0 - 54.0 fl    MPV " 10.2 6.0 - 12.0 fL    Platelets 171 140 - 450 10*3/mm3   Basic Metabolic Panel    Collection Time: 05/01/19 10:12 AM   Result Value Ref Range    Glucose 96 65 - 99 mg/dL    BUN 7 6 - 20 mg/dL    Creatinine 0.56 (L) 0.76 - 1.27 mg/dL    Sodium 139 136 - 145 mmol/L    Potassium 4.8 3.5 - 5.2 mmol/L    Chloride 102 98 - 107 mmol/L    CO2 24.0 22.0 - 29.0 mmol/L    Calcium 8.8 8.6 - 10.5 mg/dL    eGFR Non African Amer >150 >60 mL/min/1.73    BUN/Creatinine Ratio 12.5 7.0 - 25.0    Anion Gap 13.0 mmol/L         EKG: (EKG has been independently visualized by me and summarized below)      ECG 12 Lead  Date/Time: 5/20/2019 3:31 PM  Performed by: Doug Hurd MD  Authorized by: Doug Hurd MD   Comparison: compared with previous ECG from 5/1/2019  Similar to previous ECG  Rhythm: sinus rhythm  Rate: normal  BPM: 93    Clinical impression: normal ECG             ASSESSMENT and PLAN  1. SVT - Successful catheter mapping and ablation of typical atrioventricular node reentrant tachycardia.  2. Palp - Last a few seconds to a minute - Continue Metoprolol - May need a monitor in the future if it continues.  3. HTN - Use Metoprolol/Lisinopril  4. Etoh abuse - Still drinking 6-8 beers      Return in about 3 months (around 8/20/2019).        Doug Hurd M.D., F.A.C.C, F.H.R.S.  Cardiology/Electrophysiology  5/20/2019  3:33 PM

## 2019-06-19 ENCOUNTER — TELEPHONE (OUTPATIENT)
Dept: CARDIOLOGY | Facility: CLINIC | Age: 44
End: 2019-06-19

## 2019-06-20 ENCOUNTER — TELEPHONE (OUTPATIENT)
Dept: CARDIOLOGY | Facility: CLINIC | Age: 44
End: 2019-06-20

## 2019-06-20 DIAGNOSIS — I47.1 PAROXYSMAL SVT (SUPRAVENTRICULAR TACHYCARDIA) (HCC): Primary | ICD-10-CM

## 2019-06-20 NOTE — TELEPHONE ENCOUNTER
PT calling to see why he hasn't received a monitor. Pt states he spoke with me about this but I do not remember this conversations and no documentation. I placed an order per [facility] for EM.

## 2019-09-10 ENCOUNTER — OFFICE VISIT (OUTPATIENT)
Dept: CARDIOLOGY | Facility: CLINIC | Age: 44
End: 2019-09-10

## 2019-09-10 VITALS
WEIGHT: 166.6 LBS | BODY MASS INDEX: 23.85 KG/M2 | SYSTOLIC BLOOD PRESSURE: 110 MMHG | HEART RATE: 71 BPM | HEIGHT: 70 IN | DIASTOLIC BLOOD PRESSURE: 72 MMHG

## 2019-09-10 DIAGNOSIS — I47.1 PAROXYSMAL SVT (SUPRAVENTRICULAR TACHYCARDIA) (HCC): Primary | ICD-10-CM

## 2019-09-10 DIAGNOSIS — I10 ESSENTIAL HYPERTENSION: ICD-10-CM

## 2019-09-10 PROCEDURE — 99213 OFFICE O/P EST LOW 20 MIN: CPT | Performed by: INTERNAL MEDICINE

## 2019-09-10 NOTE — PROGRESS NOTES
Delta Ramires  1975  PCP: Florian Ghotra MD    SUBJECTIVE:   Delta Ramires is a 44 y.o. male seen for a follow up visit regarding the following:     Chief Complaint: Follow up for SVT    HPI:    Since last visit the patient's status has been stable, No further tachycardia. Having issues with veins in his arm and leg. Continues to have high Etoh abuse and pancreatitis      History:       Cardiac PMH: (Old records have been reviewed and summarized below)  1. Atypical chest pain   A)Negative Stress test 12/18 No ischemia, Normal EF   B)Echocardiorgram 12/18 normal EF.   2. Tobacco abuse  3. ETOH use.  4. Elevated LFT's  5. Essential tremors.   6. HTN  6. HLD: No statins secondary to elevated LFT's      Current Outpatient Medications:   •  citalopram (CeleXA) 20 MG tablet, take 1 tablet by mouth daily for 7 days then 1 tablet daily, Disp: , Rfl: 0  •  omeprazole (priLOSEC) 20 MG capsule, 20 mg As Needed., Disp: , Rfl: 0    Past Medical History, Past Surgical History, Family history, Social History, and Medications were all reviewed with the patient today and updated as necessary.       Patient Active Problem List   Diagnosis   • Pulmonary embolism (CMS/HCC)   • Paroxysmal SVT (supraventricular tachycardia) (CMS/HCC)   • Chest pain, atypical   • EtOH dependence (CMS/HCC)   • Tobacco abuse   • Essential hypertension     No Known Allergies  Past Medical History:   Diagnosis Date   • ETOH abuse    • Hypertension    • PONV (postoperative nausea and vomiting)    • Pulmonary embolism (CMS/HCC)    • Tobacco abuse      Past Surgical History:   Procedure Laterality Date   • CARDIAC ELECTROPHYSIOLOGY PROCEDURE N/A 5/1/2019    Procedure: EP/Ablation;  Surgeon: Doug Williamson MD;  Location: [facility] EP INVASIVE LOCATION;  Service: Cardiology   • CARDIAC ELECTROPHYSIOLOGY STUDY AND ABLATION      05/01/2019 per dr. williamson.   • FINGER SURGERY      reattached end of finger ( right hand)   • SHOULDER SURGERY  "Right    • WISDOM TOOTH EXTRACTION       Family History   Problem Relation Age of Onset   • Hypertension Mother    • Lung cancer Father 61   • Hypertension Brother    • Breast cancer Paternal Grandmother    • Kidney cancer Paternal Grandfather    • Kidney disease Maternal Aunt      Social History     Tobacco Use   • Smoking status: Current Every Day Smoker     Packs/day: 1.00     Years: 25.00     Pack years: 25.00     Types: Cigarettes   • Smokeless tobacco: Never Used   Substance Use Topics   • Alcohol use: Yes     Comment: 12 pack a day.         PHYSICAL EXAM:    /72 (BP Location: Left arm, Patient Position: Sitting)   Pulse 71   Ht 177.8 cm (70\")   Wt 75.6 kg (166 lb 9.6 oz)   BMI 23.90 kg/m²        Wt Readings from Last 5 Encounters:   09/10/19 75.6 kg (166 lb 9.6 oz)   05/20/19 77.1 kg (170 lb)   05/01/19 75.2 kg (165 lb 12.6 oz)   04/10/19 75.2 kg (165 lb 12.8 oz)   07/02/18 70.8 kg (156 lb)       BP Readings from Last 5 Encounters:   09/10/19 110/72   05/20/19 152/100   05/01/19 (!) 155/107   04/10/19 150/100   07/02/18 99/65       General-Well Nourished, Well developed  Eyes - PERRLA  Neck- supple, No mass  CV- regular rate and rhythm, no MRG, No edema  Lung- clear bilaterally  Abd- soft, +BS  Musc/skel - Norm strength and range of motion  Skin- warm and dry  Neuro - Alert & Oriented x 3, appropriate mood.        Medical problems and test results were reviewed with the patient today.     No results found for this or any previous visit (from the past 672 hour(s)).      EKG: (EKG has been independently visualized by me and summarized below)    Procedures     ASSESSMENT and PLAN  1. SVT - Successful catheter mapping and ablation of typical atrioventricular node reentrant tachycardia.  2. Palp - Last a few seconds to a minute - Continue Metoprolol - Monitor in July 2019 was negative for significant events  3. HTN - Stable off meds  4. Etoh abuse - Still drinking 6-8 beers at least. Encouraged to attend " AA  5. Swelling/Vein enlargement - Ongoing Liver and pancreatitis.       Return if symptoms worsen or fail to improve.        Doug Hurd M.D., FNEO.C, F.H.R.S.  Cardiology/Electrophysiology  9/10/2019  10:45 AM

## (undated) DEVICE — CATH EP SUPREME QUADPOLAR CRD2 5F 5MM 120CM

## (undated) DEVICE — CATH EP SUPRM QUADPOLAR JSN 5F 5MM 120CM

## (undated) DEVICE — ADULT, W/LG. BACK PAD, RADIOTRANSPARENT ELEMENT AND LEAD WIRE: Brand: DEFIBRILLATION ELECTRODES

## (undated) DEVICE — ST EXT IV SMARTSITE 2VLV SP M LL 5ML IV1

## (undated) DEVICE — LIMB HOLDER, WRIST/ANKLE: Brand: DEROYAL

## (undated) DEVICE — LEX ELECTRO PHYSIOLOGY: Brand: MEDLINE INDUSTRIES, INC.

## (undated) DEVICE — DECANT BG O JET

## (undated) DEVICE — DECANTER: Brand: UNBRANDED

## (undated) DEVICE — SI AVANTI+ 8F STD W/GW  NO OBT: Brand: AVANTI

## (undated) DEVICE — TEMPERATURE ABLATION CATHETER: Brand: BLAZER® II XP

## (undated) DEVICE — DRSNG SURESITE123 4X4.8IN

## (undated) DEVICE — AVANTI + 5F STD W/GW: Brand: AVANTI

## (undated) DEVICE — ST INF PRI SMRTSTE 20DRP 2VLV 24ML 117

## (undated) DEVICE — SET PRIMARY GRVTY 10DP MALE LL 104IN